# Patient Record
Sex: FEMALE | Race: BLACK OR AFRICAN AMERICAN
[De-identification: names, ages, dates, MRNs, and addresses within clinical notes are randomized per-mention and may not be internally consistent; named-entity substitution may affect disease eponyms.]

---

## 2018-02-22 ENCOUNTER — HOSPITAL ENCOUNTER (EMERGENCY)
Dept: HOSPITAL 72 - EMR | Age: 43
Discharge: HOME | End: 2018-02-22
Payer: SELF-PAY

## 2018-02-22 VITALS — BODY MASS INDEX: 23.92 KG/M2 | HEIGHT: 63 IN | WEIGHT: 135 LBS

## 2018-02-22 VITALS — SYSTOLIC BLOOD PRESSURE: 119 MMHG | DIASTOLIC BLOOD PRESSURE: 77 MMHG

## 2018-02-22 DIAGNOSIS — M79.89: Primary | ICD-10-CM

## 2018-02-22 DIAGNOSIS — M79.672: ICD-10-CM

## 2018-02-22 PROCEDURE — 99283 EMERGENCY DEPT VISIT LOW MDM: CPT

## 2018-02-23 NOTE — DIAGNOSTIC IMAGING REPORT
Indications: Foot pain

 

Technique: 3 views of the left foot

 

Comparison: None

 

Findings: No acute fractures. No dislocations. Joint spaces are preserved. No

radiopaque foreign body. Normal mineralization. Fused fourth and fifth distal

interphalangeal joints-normal anatomic variant

 

Impression: No acute process

## 2018-02-23 NOTE — EMERGENCY ROOM REPORT
History of Present Illness


General


Chief Complaint:  Lower Extremity Injury


Source:  Patient





Present Illness


HPI


42-year-old female, presenting with one day of left foot pain.  States that she 

woke up and found that her left foot was swollen, painful, still able to walk 

on it.  No fever no chills no rash.  No trauma.  No history of any gout


Allergies:  


Coded Allergies:  


     No Known Allergies (Unverified , 18)





Patient History


Past Medical History:  see triage record


Past Surgical History:  none


Pertinent Family History:  none


Last Menstrual Period:  


Pregnant Now:  No


:  2


Para:  1


Reviewed Nursing Documentation:  PMH: Agreed, PSxH: Agreed





Nursing Documentation-PMH


Past Medical History:  No Stated History





Review of Systems


All Other Systems:  negative except mentioned in HPI





Physical Exam





Vital Signs








  Date Time  Temp Pulse Resp B/P (MAP) Pulse Ox O2 Delivery O2 Flow Rate FiO2


 


18 22:10 97.9 89 20 119/77 99 Room Air  





 97.9       








Sp02 EP Interpretation:  reviewed, normal


General Appearance:  alert, GCS 15, non-toxic, mild distress


Head:  normocephalic, atraumatic


Eyes:  bilateral eye normal inspection, bilateral eye PERRL, bilateral eye EOMI


ENT:  normal ENT inspection, normal pharynx, normal voice, moist mucus membranes


Neck:  normal inspection, full range of motion, supple


Respiratory:  normal inspection, lungs clear, normal breath sounds, no 

respiratory distress, no retraction, no wheezing, speaking full sentences, 

chest symmetrical


Cardiovascular #1:  normal inspection, regular rate, rhythm, no edema, normal 

capillary refill


Cardiovascular #2:  2+ radial (R), 2+ radial (L)


Gastrointestinal:  normal inspection, non tender, soft, non-distended, no 

guarding


Musculoskeletal:  other - Left foot appears to be slightly swollen, tender to 

palpation dorsal aspect, no fifth metatarsal tenderness, full range of motion 

the foot


Neurologic:  normal inspection, alert, oriented x3, responsive, motor strength/

tone normal, sensory intact, normal gait, speech normal


Psychiatric:  normal inspection, judgement/insight normal, memory normal


Skin:  normal inspection, normal color, no rash, warm/dry, well hydrated, 

normal turgor





Medical Decision Making


Diagnostic Impression:  


 Primary Impression:  


 Foot swelling


ER Course


42-year-old female with left foot pain





DDX: 


Sprain/strain vs. fracture





Plan: 


Pain control with motrin


XR





ER course:


Patient reports improvement of pain with motrin.


XR reveals soft tissue swelling without fracture


ACE bandage applied.








Disposition:


Patient is to be discharged home


Patient educated to rest, ice, and elevate extremity and to avoid vigorous 

activity.


Strict precautions discussed with patient on when to return to the emergency 

room including increased redness or swelling joints, increased pain/swelling of 

extremity, fever or chills, which could indicate severe illness.


Patient is to follow up with their primary care doctor within 5 days. 


Patient also instructed to follow up with an orthopedic doctor if continuing to 

have mild/moderate pain as he may need further outpatient imaging. Patient 

agrees with plan.





Please note that this Emergency Department Report was dictated using [employer] technology software, occasionally this can lead to 

erroneous entry secondary to interpretation by the dictation equipment.








Xray ordered: Left foot


3 view


Indication: Pain


EP Interpretation: Yes


Interpretation: No dislocation, no soft tissue swelling, no fractures


Impression: No acute disease


Electronically signed by Maury Sullivan MD





Last Vital Signs








  Date Time  Temp Pulse Resp B/P (MAP) Pulse Ox O2 Delivery O2 Flow Rate FiO2


 


18 23:45 97.9       


 


18 23:40   20 119/77 99 Room Air  


 


18 22:10  89      








Disposition:  HOME, SELF-CARE


Condition:  Improved


Patient Instructions:  Foot Sprain





Additional Instructions:  


Please follow up with your doctor in 1 week


If you still have persistent pain, please see an orthopedic doctor as you may 

need further imaging











Maury Sullivan M.D. 2018 01:17